# Patient Record
Sex: FEMALE | Race: BLACK OR AFRICAN AMERICAN | URBAN - METROPOLITAN AREA
[De-identification: names, ages, dates, MRNs, and addresses within clinical notes are randomized per-mention and may not be internally consistent; named-entity substitution may affect disease eponyms.]

---

## 2017-01-19 ENCOUNTER — OFFICE VISIT (OUTPATIENT)
Dept: CARDIOLOGY CLINIC | Age: 42
End: 2017-01-19

## 2017-01-19 VITALS
SYSTOLIC BLOOD PRESSURE: 126 MMHG | HEIGHT: 65 IN | WEIGHT: 190 LBS | DIASTOLIC BLOOD PRESSURE: 64 MMHG | BODY MASS INDEX: 31.65 KG/M2 | HEART RATE: 57 BPM

## 2017-01-19 DIAGNOSIS — G35 MS (MULTIPLE SCLEROSIS) (HCC): Primary | ICD-10-CM

## 2017-01-19 DIAGNOSIS — Z79.899 HIGH RISK MEDICATION USE: ICD-10-CM

## 2017-01-19 DIAGNOSIS — I49.3 PVC (PREMATURE VENTRICULAR CONTRACTION): ICD-10-CM

## 2017-01-19 RX ORDER — ASPIRIN 325 MG
TABLET, DELAYED RELEASE (ENTERIC COATED) ORAL
COMMUNITY
End: 2021-01-19

## 2017-01-19 RX ORDER — GABAPENTIN 300 MG/1
300 CAPSULE ORAL 3 TIMES DAILY
COMMUNITY

## 2017-01-19 NOTE — PROGRESS NOTES
HISTORY OF PRESENT ILLNESS  Gabriela Brown is a 35 y.o. female. HPI Comments: Patient with multiple sclerosis-here to initiate high risk medication gilenya under prolonge cardiac monitoring  On follow up patient denies any chest pains,sob, palpitation or other significant symptoms. New Patient   The history is provided by the patient. Pertinent negatives include no chest pain, no abdominal pain, no headaches and no shortness of breath. Review of Systems   Constitutional: Negative for chills and fever. HENT: Negative for nosebleeds. Eyes: Negative for blurred vision and double vision. Respiratory: Negative for cough, hemoptysis, sputum production, shortness of breath and wheezing. Cardiovascular: Negative for chest pain, palpitations, orthopnea, claudication, leg swelling and PND. Gastrointestinal: Negative for abdominal pain, heartburn, nausea and vomiting. Musculoskeletal: Negative for myalgias. Skin: Negative for rash. Neurological: Negative for dizziness, weakness and headaches. Endo/Heme/Allergies: Does not bruise/bleed easily. Family History   Problem Relation Age of Onset   Vertie Amis Cancer Father     Hypertension Mother        Past Medical History   Diagnosis Date    Multiple sclerosis [facility])        Past Surgical History   Procedure Laterality Date    Hx lymph node dissection      Hx bunionectomy         Social History   Substance Use Topics    Smoking status: Never Smoker    Smokeless tobacco: Never Used    Alcohol use Yes      Comment: occasionally       Allergies   Allergen Reactions    Penicillins Rash and Itching    Sulfa (Sulfonamide Antibiotics) Rash and Itching       Prior to Admission medications    Medication Sig Start Date End Date Taking? Authorizing Provider   gabapentin (NEURONTIN) 300 mg capsule Take 300 mg by mouth three (3) times daily. Yes Historical Provider   Cholecalciferol, Vitamin D3, 50,000 unit cap Take  by mouth.    Yes Historical Provider multivitamin (ONE A DAY) tablet Take 1 Tab by mouth daily. Historical Provider         Visit Vitals    /64    Pulse (!) 57    Ht 5' 5\" (1.651 m)    Wt 86.2 kg (190 lb)    BMI 31.62 kg/m2         Physical Exam   Constitutional: She is oriented to person, place, and time. She appears well-developed and well-nourished. HENT:   Head: Normocephalic and atraumatic. Eyes: Conjunctivae are normal.   Neck: Neck supple. No JVD present. No tracheal deviation present. No thyromegaly present. Cardiovascular: Normal rate and regular rhythm. PMI is not displaced. Exam reveals no gallop, no S3 and no decreased pulses. No murmur heard. Pulmonary/Chest: No respiratory distress. She has no wheezes. She has no rales. She exhibits no tenderness. Abdominal: Soft. There is no tenderness. Musculoskeletal: She exhibits no edema. Neurological: She is alert and oriented to person, place, and time. Skin: Skin is warm. Psychiatric: She has a normal mood and affect. Ms. Angie Singh has a reminder for a \"due or due soon\" health maintenance. I have asked that she contact her primary care provider for follow-up on this health maintenance.    ekg-1/19/2016\  pre  Sinus  Rhythm  - occasional ectopic ventricular beat     Low voltage in precordial leads.    -Poor R-wave progression -   -  Nonspecific T-abnormality. ABNORMAL     Post ekg  Sinus  Bradycardia   Low voltage in precordial leads.    -Poor R-wave progression -    ABNORMAL     Assessment         ICD-10-CM ICD-9-CM    1. MS (multiple sclerosis) ([facility]) G35 340 AMB POC EKG ROUTINE W/ 12 LEADS, INTER & REP      AMB POC EKG ROUTINE W/ 12 LEADS, INTER & REP    for starting gilenya   2. High risk medication use Z79.899 V58.69     here to initiate gilenya under prolonge cardiac monitoring   3. PVC (premature ventricular contraction) I49.3 427.69     on ekg  asymptomatic   Patient was monitored per protocol in office after giving first dose of Gilenya. Patient remained hemodynamically stable and was discharged home in stable condition. Patient will follow up with neurologist      There are no discontinued medications. Orders Placed This Encounter    AMB POC EKG ROUTINE W/ 12 LEADS, INTER & REP     Order Specific Question:   Reason for Exam:     Answer:   MS    AMB POC EKG ROUTINE W/ 12 LEADS, INTER & REP     Order Specific Question:   Reason for Exam:     Answer:   MS       Follow-up Disposition:  Return if symptoms worsen or fail to improve.

## 2018-01-31 PROBLEM — N31.9 NEUROGENIC BLADDER: Status: ACTIVE | Noted: 2018-01-31

## 2018-04-02 PROBLEM — N36.44 DSD (DETRUSOR AND SPHINCTER DYSSYNERGIA): Status: ACTIVE | Noted: 2018-04-02

## 2021-01-19 PROBLEM — E78.41 ELEVATED LIPOPROTEIN(A): Status: ACTIVE | Noted: 2019-10-26

## 2021-01-19 PROBLEM — G47.09 OTHER INSOMNIA: Status: ACTIVE | Noted: 2018-04-18

## 2021-01-19 PROBLEM — M53.3 SACROILIAC JOINT DYSFUNCTION OF RIGHT SIDE: Status: ACTIVE | Noted: 2019-08-28

## 2021-01-19 PROBLEM — G89.29 CHRONIC MIDLINE LOW BACK PAIN WITHOUT SCIATICA: Status: ACTIVE | Noted: 2019-08-28

## 2021-01-19 PROBLEM — G35 MULTIPLE SCLEROSIS (HCC): Status: ACTIVE | Noted: 2017-05-22

## 2021-01-19 PROBLEM — R29.898 LEFT LEG WEAKNESS: Status: ACTIVE | Noted: 2021-01-19

## 2021-01-19 PROBLEM — R27.0 ATAXIA: Status: ACTIVE | Noted: 2020-01-27

## 2021-01-19 PROBLEM — R25.1 TREMOR: Status: ACTIVE | Noted: 2020-02-18

## 2021-01-19 PROBLEM — R79.89 ELEVATED LFTS: Status: ACTIVE | Noted: 2020-02-18

## 2021-01-19 PROBLEM — E06.3 HASHIMOTO'S THYROIDITIS: Status: ACTIVE | Noted: 2020-10-28

## 2021-01-19 PROBLEM — F32.A DEPRESSION: Status: ACTIVE | Noted: 2018-04-18

## 2021-01-19 PROBLEM — M54.50 CHRONIC MIDLINE LOW BACK PAIN WITHOUT SCIATICA: Status: ACTIVE | Noted: 2019-08-28

## 2021-01-19 PROBLEM — D70.9 NEUTROPENIA (HCC): Status: ACTIVE | Noted: 2020-10-28

## 2021-01-19 PROBLEM — E66.9 OBESITY (BMI 30.0-34.9): Status: ACTIVE | Noted: 2017-05-22

## 2021-01-19 PROBLEM — G62.9 NEUROPATHY: Status: ACTIVE | Noted: 2017-11-27

## 2022-03-18 PROBLEM — R29.898 LEFT LEG WEAKNESS: Status: ACTIVE | Noted: 2021-01-19

## 2022-03-19 PROBLEM — G89.29 CHRONIC MIDLINE LOW BACK PAIN WITHOUT SCIATICA: Status: ACTIVE | Noted: 2019-08-28

## 2022-03-19 PROBLEM — D70.9 NEUTROPENIA (HCC): Status: ACTIVE | Noted: 2020-10-28

## 2022-03-19 PROBLEM — G35 MS (MULTIPLE SCLEROSIS) (HCC): Status: ACTIVE | Noted: 2017-01-19

## 2022-03-19 PROBLEM — E66.9 OBESITY (BMI 30.0-34.9): Status: ACTIVE | Noted: 2017-05-22

## 2022-03-19 PROBLEM — R79.89 ELEVATED LFTS: Status: ACTIVE | Noted: 2020-02-18

## 2022-03-19 PROBLEM — N31.9 NEUROGENIC BLADDER: Status: ACTIVE | Noted: 2018-01-31

## 2022-03-19 PROBLEM — G35 MULTIPLE SCLEROSIS (HCC): Status: ACTIVE | Noted: 2017-05-22

## 2022-03-19 PROBLEM — G47.09 OTHER INSOMNIA: Status: ACTIVE | Noted: 2018-04-18

## 2022-03-19 PROBLEM — F32.A DEPRESSION: Status: ACTIVE | Noted: 2018-04-18

## 2022-03-19 PROBLEM — M54.50 CHRONIC MIDLINE LOW BACK PAIN WITHOUT SCIATICA: Status: ACTIVE | Noted: 2019-08-28

## 2022-03-19 PROBLEM — M53.3 SACROILIAC JOINT DYSFUNCTION OF RIGHT SIDE: Status: ACTIVE | Noted: 2019-08-28

## 2022-03-19 PROBLEM — R27.0 ATAXIA: Status: ACTIVE | Noted: 2020-01-27

## 2022-03-19 PROBLEM — E78.41 ELEVATED LIPOPROTEIN(A): Status: ACTIVE | Noted: 2019-10-26

## 2022-03-19 PROBLEM — G62.9 NEUROPATHY: Status: ACTIVE | Noted: 2017-11-27

## 2022-03-19 PROBLEM — E06.3 HASHIMOTO'S THYROIDITIS: Status: ACTIVE | Noted: 2020-10-28

## 2022-03-19 PROBLEM — R25.1 TREMOR: Status: ACTIVE | Noted: 2020-02-18

## 2022-03-19 PROBLEM — Z79.899 HIGH RISK MEDICATION USE: Status: ACTIVE | Noted: 2017-01-19

## 2022-03-19 PROBLEM — I49.3 PVC (PREMATURE VENTRICULAR CONTRACTION): Status: ACTIVE | Noted: 2017-01-19

## 2022-03-20 PROBLEM — N36.44 DSD (DETRUSOR AND SPHINCTER DYSSYNERGIA): Status: ACTIVE | Noted: 2018-04-02

## 2023-01-31 RX ORDER — TAMSULOSIN HYDROCHLORIDE 0.4 MG/1
0.4 CAPSULE ORAL
COMMUNITY
Start: 2019-12-06

## 2023-01-31 RX ORDER — METHIMAZOLE 5 MG/1
1 TABLET ORAL
COMMUNITY
Start: 2020-11-08

## 2023-01-31 RX ORDER — GABAPENTIN 300 MG/1
300 CAPSULE ORAL 3 TIMES DAILY
COMMUNITY

## 2023-01-31 RX ORDER — BACLOFEN 10 MG/1
TABLET ORAL
COMMUNITY
Start: 2020-10-21

## 2023-01-31 RX ORDER — LEVOTHYROXINE SODIUM 137 UG/1
137 TABLET ORAL DAILY
COMMUNITY
Start: 2022-05-25